# Patient Record
Sex: MALE | Race: WHITE | Employment: FULL TIME | URBAN - METROPOLITAN AREA
[De-identification: names, ages, dates, MRNs, and addresses within clinical notes are randomized per-mention and may not be internally consistent; named-entity substitution may affect disease eponyms.]

---

## 2018-11-29 ENCOUNTER — OFFICE VISIT (OUTPATIENT)
Dept: URGENT CARE | Facility: URGENT CARE | Age: 28
End: 2018-11-29
Payer: COMMERCIAL

## 2018-11-29 VITALS
WEIGHT: 165 LBS | HEART RATE: 92 BPM | TEMPERATURE: 99.3 F | BODY MASS INDEX: 23.62 KG/M2 | OXYGEN SATURATION: 99 % | HEIGHT: 70 IN | DIASTOLIC BLOOD PRESSURE: 79 MMHG | SYSTOLIC BLOOD PRESSURE: 126 MMHG

## 2018-11-29 DIAGNOSIS — J06.9 VIRAL URI WITH COUGH: Primary | ICD-10-CM

## 2018-11-29 DIAGNOSIS — H61.22 IMPACTED CERUMEN OF LEFT EAR: ICD-10-CM

## 2018-11-29 DIAGNOSIS — R07.0 THROAT PAIN: ICD-10-CM

## 2018-11-29 LAB
DEPRECATED S PYO AG THROAT QL EIA: NORMAL
SPECIMEN SOURCE: NORMAL

## 2018-11-29 PROCEDURE — 99203 OFFICE O/P NEW LOW 30 MIN: CPT | Mod: 25 | Performed by: PHYSICIAN ASSISTANT

## 2018-11-29 PROCEDURE — 87081 CULTURE SCREEN ONLY: CPT | Performed by: PHYSICIAN ASSISTANT

## 2018-11-29 PROCEDURE — 69209 REMOVE IMPACTED EAR WAX UNI: CPT | Mod: LT | Performed by: PHYSICIAN ASSISTANT

## 2018-11-29 PROCEDURE — 87880 STREP A ASSAY W/OPTIC: CPT | Performed by: PHYSICIAN ASSISTANT

## 2018-11-29 NOTE — MR AVS SNAPSHOT
After Visit Summary   11/29/2018    Brayden Redmond    MRN: [id number]           Patient Information     Date Of Birth          1990        Visit Information        Provider Department      11/29/2018 6:50 PM Libia Davis PA-C [facility]        Today's Diagnoses     Viral URI with cough    -  1    Throat pain        Impacted cerumen of left ear          Care Instructions    1. Viral upper respiratory infection.  Testing today showed negative Strep test.    Your symptoms are most likely due to a viral self-limiting infection that should clear spontaneously in the next 3-7 days.    Symptomatic cares recommended:  -nasal saline rinses/sprays 1-2 times daily (use distilled or previously boiled water)  -adequate rest  -copious hydration  -ibuprofen or acetaminophen for comfort  -Robitussin or Mucinex as needed for cough, nasal congestion  -throat lozenges as needed for sore throat    Follow-up with primary care provider if not improving in 5-7 days, sooner if worsening.     If you develop high fevers that do not go down with ibuprofen/Tylenol, shortness of breath, cough up any blood, chest pain, or any other new, concerning symptoms, please go to the ER for further evaluation.            Follow-ups after your visit        Follow-up notes from your care team     Return if symptoms worsen or fail to improve.      Who to contact     If you have questions or need follow up information about today's clinic visit or your schedule please contact [facility] directly at [phone].  Normal or non-critical lab and imaging results will be communicated to you by MyChart, letter or phone within 4 business days after the clinic has received the results. If you do not hear from us within 7 days, please contact the clinic through MyChart or phone. If you have a critical or abnormal lab result, we will notify you by phone as soon as possible.  Submit refill  "requests through [employer] or call your pharmacy and they will forward the refill request to us. Please allow 3 business days for your refill to be completed.          Additional Information About Your Visit        [employer] Information     [employer] lets you send messages to your doctor, view your test results, renew your prescriptions, schedule appointments and more. To sign up, go to [email or web address] . Click on \"Log in\" on the left side of the screen, which will take you to the Welcome page. Then click on \"Sign up Now\" on the right side of the page.     You will be asked to enter the access code listed below, as well as some personal information. Please follow the directions to create your username and password.     Your access code is: [email or web address]  Expires: 2019  8:28 PM     Your access code will  in 90 days. If you need help or a new code, please call your [facility] clinic or [phone].        Care EveryWhere ID     This is your Care EveryWhere ID. This could be used by other organizations to access your [facility] medical records  [id number]        Your Vitals Were     Pulse Temperature Height Pulse Oximetry BMI (Body Mass Index)       92 99.3  F (37.4  C) (Oral) 5' 10\" (1.778 m) 99% 23.68 kg/m2        Blood Pressure from Last 3 Encounters:   18 126/79    Weight from Last 3 Encounters:   18 165 lb (74.8 kg)              We Performed the Following     Beta strep group A culture     HC REMOVAL IMPACTED CERUMEN IRRIGATION/LVG UNILAT     Rapid strep screen        Primary Care Provider Fax #    Provider Not In System [phone]                Equal Access to Services     [facility] RADHA : Hadii kirsten Delgado, waaxda cristóbalqadaha, qaybta kaalmada yahaira, chino cornelius. So [facility] [phone].    ATENCIÓN: Si habla español, tiene a shah disposición servicios gratuitos de asistencia lingüística. Llame al [phone].    We comply with applicable federal civil " rights laws and [place] laws. We do not discriminate on the basis of race, color, national origin, age, disability, sex, sexual orientation, or gender identity.            Thank you!     Thank you for choosing [facility]  for your care. Our goal is always to provide you with excellent care. Hearing back from our patients is one way we can continue to improve our services. Please take a few minutes to complete the written survey that you may receive in the mail after your visit with us. Thank you!             Your Updated Medication List - Protect others around you: Learn how to safely use, store and throw away your medicines at www.disposemymeds.org.          This list is accurate as of 11/29/18  8:28 PM.  Always use your most recent med list.                   Brand Name Dispense Instructions for use Diagnosis    TYLENOL PO

## 2018-11-30 LAB
BACTERIA SPEC CULT: NORMAL
SPECIMEN SOURCE: NORMAL

## 2018-11-30 NOTE — NURSING NOTE
Patient identified using two patient identifiers.  Ear exam showing wax occlusion completed by provider.  Solution: warm water was placed in the bilateral ear(s) via irrigation tool: elephant ear   smiller,cma  .

## 2018-11-30 NOTE — PATIENT INSTRUCTIONS
1. Viral upper respiratory infection.  Testing today showed negative Strep test.    Your symptoms are most likely due to a viral self-limiting infection that should clear spontaneously in the next 3-7 days.    Symptomatic cares recommended:  -nasal saline rinses/sprays 1-2 times daily (use distilled or previously boiled water)  -adequate rest  -copious hydration  -ibuprofen or acetaminophen for comfort  -Robitussin or Mucinex as needed for cough, nasal congestion  -throat lozenges as needed for sore throat    Follow-up with primary care provider if not improving in 5-7 days, sooner if worsening.     If you develop high fevers that do not go down with ibuprofen/Tylenol, shortness of breath, cough up any blood, chest pain, or any other new, concerning symptoms, please go to the ER for further evaluation.

## 2018-11-30 NOTE — PROGRESS NOTES
"SUBJECTIVE:   Brayden Redmond is a 28 year old male presenting with a chief complaint of   Chief Complaint   Patient presents with     Urgent Care     Pt in clinic to have eval for sore throat, fever, cough, headache, and congestion.     Pharyngitis     Respiratory Problems     Headache     Fever   .    URI Adult    Onset of symptoms was 4 day(s) ago.  Course of illness is worsening.    Severity moderately severe  Current and Associated symptoms: sudden onset sore throat, HA, congestion on Monday.  Had a fever yesterday. Tmax 100.4F.  Feels fatigued. Admits to body aches.  Admits to sometimes productive cough.   Had some nausea, no vomiting. Is tolerating PO intake. Had some diarrhea yesterday. No abdominal pain. No rashes.  Treatment measures tried include Tylenol  Predisposing factors include: no known ill contacts. No PMH asthma.    ROS  See HPI    PMH:  No past medical history on file.  There is no problem list on file for this patient.      Current Medications:  Current Outpatient Prescriptions   Medication Sig Dispense Refill     Acetaminophen (TYLENOL PO)          Surgical History:  No past surgical history on file.    Family History:  No family history on file.    Social History:  Social History   Substance Use Topics     Smoking status: Never Smoker     Smokeless tobacco: Never Used     Alcohol use Not on file          OBJECTIVE  /79  Pulse 92  Temp 99.3  F (37.4  C) (Oral)  Ht 5' 10\" (1.778 m)  Wt 165 lb (74.8 kg)  SpO2 99%  BMI 23.68 kg/m2    General: alert, appears generally well, NAD. Afebrile.  Skin: no suspicious lesions or rashes.  HEENT: Normocephalic.   Eyes: conjunctiva clear.   Ears: right ear: TMs pearly, translucent. Scant cerumen in canal. Left ear: complete cerumen impaction.   Nose:+ erythema of nasal mucosa. No rhinorrhea.  Oropharynx: MMM. Mild posterior pharyngeal erythema, no petechiae or exudate. No tonsillar hypertrophy. Uvula midline.    Neck: supple, no " lymphadenopathy.  Respiratory: No distress. Equal inspiration to bilateral bases. No crackles wheeze, rhonchi, rales.   Cardiovascular: RRR. No murmurs, clicks, gallups, or rub.   Gastrointestinal: Abdomen soft, nontender, BS present. No masses, organomegaly.        Labs:  Results for orders placed or performed in visit on 11/29/18 (from the past 24 hour(s))   Rapid strep screen   Result Value Ref Range    Specimen Description Throat     Rapid Strep A Screen       NEGATIVE: No Group A streptococcal antigen detected by immunoassay, await culture report.           ASSESSMENT/PLAN:    ICD-10-CM    1. Viral URI with cough J06.9     B97.89    2. Throat pain R07.0 Rapid strep screen     Beta strep group A culture   3. Impacted cerumen of left ear H61.22 HC REMOVAL IMPACTED CERUMEN IRRIGATION/LVG UNILAT           Medical Decision Making:    Serious Comorbid Conditions: none    Differential Diagnosis:   -Strep- RST negative. No signs of tonsillitis, peritonsillar abscess, deep neck abscess on exam or history today. Is tolerating PO intake.  -influenza- considered but is far outside the treatment window now (4 days into illness) and does not have comorbidities to warrant treatment regardless of time frame. Appears well now and is afebrile here. Did not test for influenza today. Discussed this reasoning with patient.  -pneumonia- less likely as lungs are clear, has diffuse URI symptoms. Do not think CXR is indicated today.  -viral URI- most suspect this to be the cause of symptoms.    I discussed likely viral etiology of URI symptoms. Recommend supportive cares including rest, antipyretics as needed, time. Follow up with PCP if no improvement in 5-7 days, sooner if worsening.    Patient was found to have cerumen impaction on left side on initial exam. I ordered for MA to perform irrigation. Upon my recheck, cerumen was completely removed and TM was clear- no signs of otitis media.    At the end of the encounter, I discussed  all available results, as well as the diagnosis and any associated medications. I discussed red flags for immediate return to clinic/ER, as well as indications for follow up. Refer to patient instructions below, which were all addressed with patient. Patient understood and agreed to plan. Patient was appropriate for discharge.      Patient Instructions   1. Viral upper respiratory infection.  Testing today showed negative Strep test.    Your symptoms are most likely due to a viral self-limiting infection that should clear spontaneously in the next 3-7 days.    Symptomatic cares recommended:  -nasal saline rinses/sprays 1-2 times daily (use distilled or previously boiled water)  -adequate rest  -copious hydration  -ibuprofen or acetaminophen for comfort  -Robitussin or Mucinex as needed for cough, nasal congestion  -throat lozenges as needed for sore throat    Follow-up with primary care provider if not improving in 5-7 days, sooner if worsening.     If you develop high fevers that do not go down with ibuprofen/Tylenol, shortness of breath, cough up any blood, chest pain, or any other new, concerning symptoms, please go to the ER for further evaluation.                Libia Davis PA-C

## 2019-01-07 ENCOUNTER — OFFICE VISIT (OUTPATIENT)
Dept: INTERNAL MEDICINE | Facility: CLINIC | Age: 29
End: 2019-01-07
Payer: COMMERCIAL

## 2019-01-07 VITALS
BODY MASS INDEX: 24.91 KG/M2 | DIASTOLIC BLOOD PRESSURE: 70 MMHG | HEART RATE: 74 BPM | OXYGEN SATURATION: 99 % | WEIGHT: 174 LBS | SYSTOLIC BLOOD PRESSURE: 117 MMHG | HEIGHT: 70 IN

## 2019-01-07 DIAGNOSIS — Z00.00 ROUTINE HEALTH MAINTENANCE: Primary | ICD-10-CM

## 2019-01-07 SDOH — HEALTH STABILITY: MENTAL HEALTH: HOW OFTEN DO YOU HAVE A DRINK CONTAINING ALCOHOL?: 2-3 TIMES A WEEK

## 2019-01-07 ASSESSMENT — ENCOUNTER SYMPTOMS
HOARSE VOICE: 0
NERVOUS/ANXIOUS: 1
TASTE DISTURBANCE: 0
SINUS CONGESTION: 0
BLOATING: 0
NAUSEA: 0
ABDOMINAL PAIN: 0
TROUBLE SWALLOWING: 0
DECREASED CONCENTRATION: 0
SINUS PAIN: 0
VOMITING: 0
DEPRESSION: 1
SORE THROAT: 0
RECTAL PAIN: 0
BLOOD IN STOOL: 0
SMELL DISTURBANCE: 0
DIARRHEA: 1
PANIC: 0
BOWEL INCONTINENCE: 0
CONSTIPATION: 0
NECK MASS: 0
HEARTBURN: 0
INSOMNIA: 0
JAUNDICE: 0

## 2019-01-07 ASSESSMENT — PAIN SCALES - GENERAL: PAINLEVEL: NO PAIN (0)

## 2019-01-07 ASSESSMENT — MIFFLIN-ST. JEOR: SCORE: 1775.51

## 2019-01-07 NOTE — PATIENT INSTRUCTIONS
[facility] Medication Refill Request Information:  * Please contact your pharmacy regarding ANY request for medication refills.  ** [facility] Prescription Fax = [phone]  * Please allow 3 business days for routine medication refills.  * Please allow 5 business days for controlled substance medication refills.     [facility] Test Result notification information:  *You will be notified with in 7-10 days of your appointment day regarding the results of your test.  If you are on MyChart you will be notified as soon as the provider has reviewed the results and signed off on them.    [facility]: [phone]

## 2019-01-07 NOTE — NURSING NOTE
Chief Complaint   Patient presents with     Establish Care     patient is establishing care with dr shirley Ramirez at 8:50 AM on 1/7/2019.

## 2019-01-07 NOTE — PROGRESS NOTES
HPI  28-year-old [facility]  presents today to establish primary care.  He has been in good health.  He exercises regularly he has been tolerating this well.  His diet is reasonably healthy although he concedes he could do better with his fruit and vegetable consumption.  Is under a lot of stress at work is coping with this with the support of his coworkers and exercise.  Since stable relationship this is also providing him a source of support in dealing with work stress.  He has had some issues with hemorrhoids is dealing with this with topical steroid and we discussed using sitz baths.  Past Medical History:   Diagnosis Date     NO ACTIVE PROBLEMS      Past Surgical History:   Procedure Laterality Date     ORIF left arm       TONSILLECTOMY       Family History   Problem Relation Age of Onset     Melanoma Father      Social History     Socioeconomic History     Marital status:      Spouse name: None     Number of children: None     Years of education: None     Highest education level: None   Social Needs     Financial resource strain: None     Food insecurity - worry: None     Food insecurity - inability: None     Transportation needs - medical: None     Transportation needs - non-medical: None   Occupational History     Occupation:      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   Tobacco Use     Smoking status: Never Smoker     Smokeless tobacco: Never Used   Substance and Sexual Activity     Alcohol use: Yes     Frequency: 2-3 times a week     Drug use: No     Sexual activity: Yes     Partners: Male   Other Topics Concern     None   Social History Narrative     None     Answers for HPI/ROS submitted by the patient on 1/7/2019   General Symptoms: No  Skin Symptoms: No  HENT Symptoms: Yes  EYE SYMPTOMS: No  HEART SYMPTOMS: No  LUNG SYMPTOMS: No  INTESTINAL SYMPTOMS: Yes  URINARY SYMPTOMS: No  REPRODUCTIVE SYMPTOMS: No  SKELETAL SYMPTOMS: No  BLOOD SYMPTOMS: No  NERVOUS SYSTEM SYMPTOMS: No  MENTAL  "HEALTH SYMPTOMS: Yes  Ear pain: No  Ear discharge: No  Hearing loss: No  Tinnitus: No  Nosebleeds: No  Congestion: No  Sinus pain: No  Trouble swallowing: No   Voice hoarseness: No  Mouth sores: No  Sore throat: No  Tooth pain: No  Gum tenderness: No  Bleeding gums: Yes  Change in taste: No  Change in sense of smell: No  Dry mouth: No  Hearing aid used: No  Neck lump: No  Heart burn or indigestion: No  Nausea: No  Vomiting: No  Abdominal pain: No  Bloating: No  Constipation: No  Diarrhea: Yes  Blood in stool: No  Black stools: No  Rectal or Anal pain: No  Fecal incontinence: No  Yellowing of skin or eyes: No  Vomit with blood: No  Change in stools: No  Nervous or Anxious: Yes  Depression: Yes  Trouble sleeping: No  Trouble thinking or concentrating: No  Mood changes: Yes  Panic attacks: No    Exam:  /70 (BP Location: Right arm, Patient Position: Sitting)   Pulse 74   Ht 1.778 m (5' 10\")   Wt 78.9 kg (174 lb)   SpO2 99%   BMI 24.97 kg/m    174 lbs 0 oz  Physical Exam   The patient is alert, oriented with a clear sensorium.   Skin shows no lesions or rashes and good turgor.   Head is normocephalic and atraumatic.   Eyes show PERRLA with benign optic fundi.   Ears show normal TMs bilaterally.   Mouth shows clear oral mucosa.   Neck shows no nodes, thyromegaly or bruits.   Back is non tender.  Lungs are clear to percussion and auscultation.   Heart shows normal S1 and S2 without murmur or gallop.   Abdomen is soft and nontender without masses or organomegaly.   Genitalia show normal testes. No evidence of inguinal hernia.  Extremities show no edema and no evidence of active synovitis.   Neurologic examination shows cranial nerves II-XII intact. Motor shows 5/5 strength. Reflexes are symmetric. Cerebellar testing shows normal tandem gait.  Romberg negative.    ASSESSMENT  1 stress and anxiety  2 history of hemorrhoids    Plan  Recent labs were reviewed through care everywhere and are excellent he is current on " his immunizations will encourage him to exercise for stress reduction follow-up in a couple years or as needed for symptoms.    This note was completed using Dragon voice recognition software.  Although reviewed after completion, some word and grammatical errors may occur.    Nic Le MD  General Internal Medicine  [facility]  [phone]

## 2019-02-21 ENCOUNTER — TELEPHONE (OUTPATIENT)
Dept: OTHER | Facility: CLINIC | Age: 29
End: 2019-02-21

## 2019-02-21 NOTE — TELEPHONE ENCOUNTER
2/21/2019    Call Regarding Onboarding MVP OTHER     Attempt 1    Message on voicemail     Comments:       Outreach   LR

## 2019-02-26 NOTE — TELEPHONE ENCOUNTER
2/26/2019    Call Regarding Onboarding: SALLIE NAVA     Attempt 2    Message on voicemail     Comments: 1 Adult Dep      Outreach   JOSELUIS

## 2019-03-02 NOTE — TELEPHONE ENCOUNTER
3/2/2019    Call Regarding Onboarding: SALLIE NAVA     Attempt 3    Message left with patient     Comments: 1 Adult Dep      Outreach   JOSELUIS

## 2020-02-05 ENCOUNTER — OFFICE VISIT (OUTPATIENT)
Dept: INTERNAL MEDICINE | Facility: CLINIC | Age: 30
End: 2020-02-05
Payer: COMMERCIAL

## 2020-02-05 ENCOUNTER — TELEPHONE (OUTPATIENT)
Dept: INTERNAL MEDICINE | Facility: CLINIC | Age: 30
End: 2020-02-05

## 2020-02-05 VITALS
TEMPERATURE: 97.9 F | BODY MASS INDEX: 24.74 KG/M2 | OXYGEN SATURATION: 97 % | RESPIRATION RATE: 18 BRPM | HEART RATE: 64 BPM | SYSTOLIC BLOOD PRESSURE: 120 MMHG | DIASTOLIC BLOOD PRESSURE: 85 MMHG | WEIGHT: 172.4 LBS

## 2020-02-05 DIAGNOSIS — B07.9 VIRAL WARTS, UNSPECIFIED TYPE: ICD-10-CM

## 2020-02-05 DIAGNOSIS — K64.9 BLEEDING HEMORRHOIDS: Primary | ICD-10-CM

## 2020-02-05 RX ORDER — HYDROCORTISONE ACETATE SUPPOSITORY 30 MG/1
1 SUPPOSITORY RECTAL DAILY
Qty: 28 SUPPOSITORY | Refills: 3 | Status: SHIPPED | OUTPATIENT
Start: 2020-02-05 | End: 2020-10-01

## 2020-02-05 ASSESSMENT — ANXIETY QUESTIONNAIRES
3. WORRYING TOO MUCH ABOUT DIFFERENT THINGS: NEARLY EVERY DAY
1. FEELING NERVOUS, ANXIOUS, OR ON EDGE: MORE THAN HALF THE DAYS
GAD7 TOTAL SCORE: 16
7. FEELING AFRAID AS IF SOMETHING AWFUL MIGHT HAPPEN: MORE THAN HALF THE DAYS
IF YOU CHECKED OFF ANY PROBLEMS ON THIS QUESTIONNAIRE, HOW DIFFICULT HAVE THESE PROBLEMS MADE IT FOR YOU TO DO YOUR WORK, TAKE CARE OF THINGS AT HOME, OR GET ALONG WITH OTHER PEOPLE: VERY DIFFICULT
6. BECOMING EASILY ANNOYED OR IRRITABLE: NEARLY EVERY DAY
2. NOT BEING ABLE TO STOP OR CONTROL WORRYING: NEARLY EVERY DAY
5. BEING SO RESTLESS THAT IT IS HARD TO SIT STILL: NOT AT ALL

## 2020-02-05 ASSESSMENT — PATIENT HEALTH QUESTIONNAIRE - PHQ9
SUM OF ALL RESPONSES TO PHQ QUESTIONS 1-9: 9
SUM OF ALL RESPONSES TO PHQ QUESTIONS 1-9: 9
5. POOR APPETITE OR OVEREATING: NEARLY EVERY DAY
10. IF YOU CHECKED OFF ANY PROBLEMS, HOW DIFFICULT HAVE THESE PROBLEMS MADE IT FOR YOU TO DO YOUR WORK, TAKE CARE OF THINGS AT HOME, OR GET ALONG WITH OTHER PEOPLE: VERY DIFFICULT

## 2020-02-05 ASSESSMENT — PAIN SCALES - GENERAL: PAINLEVEL: NO PAIN (0)

## 2020-02-05 NOTE — NURSING NOTE
Chief Complaint   Patient presents with     Derm Problem     pt. is concerned with a spot on his bottom        Page Perales, EMT

## 2020-02-05 NOTE — PATIENT INSTRUCTIONS
[facility] Medication Refill Request Information:  * Please contact your pharmacy regarding ANY request for medication refills.  ** [facility] Prescription Fax = [phone]  * Please allow 3 business days for routine medication refills.  * Please allow 5 business days for controlled substance medication refills.     [facility] Test Result notification information:  *You will be notified with in 7-10 days of your appointment day regarding the results of your test.  If you are on MyChart you will be notified as soon as the provider has reviewed the results and signed off on them.

## 2020-02-06 ASSESSMENT — ANXIETY QUESTIONNAIRES: GAD7 TOTAL SCORE: 16

## 2020-02-06 NOTE — TELEPHONE ENCOUNTER
[facility] Prior Authorization Team Request    Medication: Hydrocortisone 30 mg Suppository  Dosing: Unwrap and insert one suppository rectally once daily  Qty: 28  Day Supply: 28  NDC (required for Medicaid members): [address]-57     Insurance   BIN: [id number]  PCN: [id number]  Grp:   ID: [id number]    CoverMyMeds Key (if applicable):     Additional documentation:       Filling Pharmacy: [facility]  Phone Number: [phone]  Contact:  Shorty Le  Pharmacy NPI (required for Medicaid members): [id number]

## 2020-02-06 NOTE — PROGRESS NOTES
HPI  29-year-old presents today for rectal bleeding.  He has had a long history of intermittent rectal bleeding has been attributed to hemorrhoids in the past.  He continues to have bright red blood only when he wipes.  He is not noted any blood mixed in with the stool is not any diarrhea has not had any other change in his bowel movements.  He said no associated fever chills sweats abdominal pain or other complaints.  Otherwise he has been doing well until recently when he noted a lump near his rectum.  He is concerned that the lump may be contributing to his bleeding.  He has not had any pain or discomfort in the lump.  Otherwise he is been in good health he does have sex with men but does not have receptive anal intercourse by his report.  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   Social Needs     Financial resource strain: None     Food insecurity:     Worry: None     Inability: None     Transportation needs:     Medical: None     Non-medical: None   Tobacco Use     Smoking status: Never Smoker     Smokeless tobacco: Never Used   Substance and Sexual Activity     Alcohol use: Yes     Frequency: 2-3 times a week     Drug use: No     Sexual activity: Yes     Partners: Male   Lifestyle     Physical activity:     Days per week: None     Minutes per session: None     Stress: None   Relationships     Social connections:     Talks on phone: None     Gets together: None     Attends Restorationist service: None     Active member of club or organization: None      Attends meetings of clubs or organizations: None     Relationship status: None     Intimate partner violence:     Fear of current or ex partner: None     Emotionally abused: None     Physically abused: None     Forced sexual activity: None   Other Topics Concern     None   Social History Narrative     None     Answers for HPI/ROS submitted by the patient on 2/5/2020   If you checked off any problems, how difficult have these problems made it for you to do your work, take care of things at home, or get along with other people?: Very difficult  PHQ9 TOTAL SCORE: 9    Exam:  /85 (BP Location: Right arm, Patient Position: Chair, Cuff Size: Adult Large)   Pulse 64   Temp 97.9  F (36.6  C) (Oral)   Resp 18   Wt 78.2 kg (172 lb 6.4 oz)   SpO2 97%   BMI 24.74 kg/m    172 lbs 6.4 oz  On examination there is a small somewhat pedunculated wart along the 9 o'clock position near the rectum the rectum the anus itself appears normal there is appears to be internal hemorrhoids normal prostate on digital examination    ASSESSMENT  1 perianal wart  2 bleeding hemorrhoids    Plan  We discussed a high-fiber diet plenty of fluids sitz bath's we will try Proctocort suppositories for a month we will follow-up with colorectal regarding destruction of the wart    This note was completed using Dragon voice recognition software.  Although reviewed after completion, some word and grammatical errors may occur.    Nic Le MD  General Internal Medicine  [facility]  [phone]

## 2020-02-07 NOTE — TELEPHONE ENCOUNTER
RECORDS RECEIVED FROM: [facility]- Dr. Nic Le    DATE RECEIVED: 3/11/2020   NOTES STATUS DETAILS   OFFICE NOTE from referring provider  Internal 2/5/2020 Office visit with Dr. Le    OFFICE NOTE from other specialist   N/A    DISCHARGE SUMMARY from hospital  N/A    DISCHARGE REPORT from the ER N/A    OPERATIVE REPORT  N/A    MEDICATION LIST Internal    LABS     PFC TESTING N/A    ANAL PAP N/A    BIOPSIES/PATHOLOGY RELATED TO DIAGNOSIS N/A    DIAGNOSTIC PROCEDURES     COLONOSCOPY N/A    UPPER ENDOSCOPY (EGD) N/A    FLEX SIGMOIDOSCOPY  N/A    ERCP N/A    IMAGING (DISC & REPORT)      CT  N/A    MRI N/A    XRAY N/A    ULTRASOUND (ENDOANAL/ENDORECTAL) N/A      *call Pt for outside med recs    2/20/2020 4:49pm Called and spoke with Pt; no outside med recs. -Jojo

## 2020-02-08 NOTE — TELEPHONE ENCOUNTER
Central Prior Authorization Team   Phone: [phone]      PA Initiation    Medication: Hydrocortisone 30 mg Suppository  Insurance Company: [employer] - Phone [phone] Fax [phone]  Pharmacy Filling the Rx: [facility] [place], [place] - [address] [place] [address] [phone]  Filling Pharmacy Phone: [phone]  Filling Pharmacy Fax:    Start Date: 2/7/2020

## 2020-02-12 NOTE — TELEPHONE ENCOUNTER
Looks like he will need to buy it OTC  JITENDRA    PRIOR AUTHORIZATION DENIED    Medication: Hydrocortisone 30 mg Suppository-DENIED    Denial Date: 2/11/2020    Denial Rational:         Appeal Information:

## 2020-02-18 NOTE — TELEPHONE ENCOUNTER
I updated patient that hydrocortisone suppository is not covered by insurance. We discussed treatment options including paying out of pocket (recommended comparing prices at [facility]) or purchasing an OTC option. Emmett voiced understanding of the recommendations.    Debbie Palumbo) EMILY Lucero

## 2020-03-11 ENCOUNTER — OFFICE VISIT (OUTPATIENT)
Dept: SURGERY | Facility: CLINIC | Age: 30
End: 2020-03-11
Payer: COMMERCIAL

## 2020-03-11 ENCOUNTER — HEALTH MAINTENANCE LETTER (OUTPATIENT)
Age: 30
End: 2020-03-11

## 2020-03-11 ENCOUNTER — PRE VISIT (OUTPATIENT)
Dept: SURGERY | Facility: CLINIC | Age: 30
End: 2020-03-11

## 2020-03-11 VITALS
HEART RATE: 61 BPM | WEIGHT: 167.6 LBS | OXYGEN SATURATION: 95 % | SYSTOLIC BLOOD PRESSURE: 132 MMHG | BODY MASS INDEX: 23.99 KG/M2 | DIASTOLIC BLOOD PRESSURE: 85 MMHG | HEIGHT: 70 IN

## 2020-03-11 DIAGNOSIS — K62.5 RECTAL BLEEDING: Primary | ICD-10-CM

## 2020-03-11 ASSESSMENT — ENCOUNTER SYMPTOMS
BLOATING: 0
SMELL DISTURBANCE: 0
BOWEL INCONTINENCE: 0
POOR WOUND HEALING: 0
DEPRESSION: 1
NAUSEA: 0
SKIN CHANGES: 0
SINUS PAIN: 0
BLOOD IN STOOL: 1
NERVOUS/ANXIOUS: 1
PANIC: 1
RECTAL PAIN: 0
CONSTIPATION: 0
SINUS CONGESTION: 0
NECK MASS: 0
HOARSE VOICE: 0
TASTE DISTURBANCE: 0
HEARTBURN: 0
NAIL CHANGES: 0
INSOMNIA: 1
VOMITING: 0
DECREASED CONCENTRATION: 0
TROUBLE SWALLOWING: 0
JAUNDICE: 0
DIARRHEA: 0
ABDOMINAL PAIN: 0
SORE THROAT: 0

## 2020-03-11 ASSESSMENT — PAIN SCALES - GENERAL: PAINLEVEL: NO PAIN (0)

## 2020-03-11 ASSESSMENT — MIFFLIN-ST. JEOR: SCORE: 1741.48

## 2020-03-11 NOTE — PROGRESS NOTES
"Colon and Rectal Surgery Consult Clinic Note    Date: 3/11/2020     Referring provider:  Nic Le MD  [address] [place] [address]     RE: Brayden Redmond  : 1990  BALTAZAR: 3/11/2020    Brayden Redmond is a very pleasant 29 year old male without a significant past medical history with a recent diagnosis of rectal bleeding and anal wart.  Given these findings they were subsequently sent to the Colon and Rectal Surgery Clinic for an opinion on this and a new patient consultation.     Brayden reports that he has had a small bump in his perianal skin for about a year.  This is not painful.  He is never had any anogenital warts in the past.  He is otherwise healthy.  He is not a smoker.  He gets intermittent bright red blood with bowel movements.  He reports that this is a very small amount on the toilet paper with wiping and not associated with any pain.  No prolapsing tissue.  Bowel movements are anywhere from loose to formed.  He denies any constipation or straining.    Physical Examination: Exam was chaperoned by Subhash Mishra LPN   /85 (BP Location: Left arm, Patient Position: Sitting, Cuff Size: Adult Regular)   Pulse 61   Ht 5' 10\"   Wt 167 lb 9.6 oz   SpO2 95%   BMI 24.05 kg/m    General: Alert, oriented, in no acute distress, sitting comfortably  HEENT: Mucous membranes moist  Perianal external examination:  Perianal skin: Small skin tag in the left lateral perianal position.  No perianal condyloma on exam.  Digital rectal examination: Was performed.   Sphincter tone: Good.  Palpable lesions: No.  Prostate: Normal.  Other: None.    Anoscopy: Was performed.   Hemorrhoids: Yes.  Grade 2 internal hemorrhoids without active bleeding  Lesions: No    Assessment/Plan: No evidence of any condyloma on exam.  He does have a very small skin tag in left lateral position but this appears completely benign.  He does have some small internal hemorrhoids and get some intermittent bright red " blood.  He reports that this is only a very small amount on the toilet paper with wiping.  He does have some intermittent loose stools.  Would like to try bulking of his stools with a fiber supplement to see if this improves the symptoms.  However, if bleeding persist, would recommend a colonoscopy.  We discussed doing a colonoscopy now versus trying the fiber supplement first.  He would first like to try the fiber supplement and will contact the clinic if he has any persistent bleeding. Patient's questions were answered to his stated satisfaction and he is in agreement with this plan.    Medical history:  Past Medical History:   Diagnosis Date     NO ACTIVE PROBLEMS        Surgical history:  Past Surgical History:   Procedure Laterality Date     ORIF left arm       TONSILLECTOMY         Problem list:  There are no active problems to display for this patient.      Medications:  Current Outpatient Medications   Medication Sig Dispense Refill     Acetaminophen (TYLENOL PO) Take by mouth as needed for mild pain        hydrocortisone acetate (PROCTOCORT) 30 MG SUPP Place 1 suppository rectally daily (Patient not taking: Reported on 3/11/2020) 28 suppository 3       Allergies:  No Known Allergies    Family history:  Family History   Problem Relation Age of Onset     Melanoma Father        Social history:  Social History     Tobacco Use     Smoking status: Never Smoker     Smokeless tobacco: Never Used   Substance Use Topics     Alcohol use: Yes     Frequency: 2-3 times a week    Marital status: .  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    There are no exam notes on file for this visit.     Total face to face time was 20 minutes, >50% counseling.    CORINA Lerma, NP-C  Colon and Rectal Surgery   [facility]    This note was created using speech recognition software and may contain unintended word substitutions.

## 2020-03-11 NOTE — LETTER
"3/11/2020       RE: Brayden Redmond  [address]  [address]  [place] [facility] [place] [address]     Dear Colleague,    Thank you for referring your patient, Brayden Redmond, to the [facility] COLON AND RECTAL SURGERY at [facility]. Please see a copy of my visit note below.    Colon and Rectal Surgery Consult Clinic Note    Date: 3/11/2020     Referring provider:  Nic Le MD  [address] [place] [address]     RE: Brayden Redmond  : 1990  BALTAZAR: 3/11/2020    Brayden Redmond is a very pleasant 29 year old male without a significant past medical history with a recent diagnosis of rectal bleeding and anal wart.  Given these findings they were subsequently sent to the Colon and Rectal Surgery Clinic for an opinion on this and a new patient consultation.     Brayden reports that he has had a small bump in his perianal skin for about a year.  This is not painful.  He is never had any anogenital warts in the past.  He is otherwise healthy.  He is not a smoker.  He gets intermittent bright red blood with bowel movements.  He reports that this is a very small amount on the toilet paper with wiping and not associated with any pain.  No prolapsing tissue.  Bowel movements are anywhere from loose to formed.  He denies any constipation or straining.    Physical Examination: Exam was chaperoned by Subhash Mishra LPN   /85 (BP Location: Left arm, Patient Position: Sitting, Cuff Size: Adult Regular)   Pulse 61   Ht 5' 10\"   Wt 167 lb 9.6 oz   SpO2 95%   BMI 24.05 kg/m    General: Alert, oriented, in no acute distress, sitting comfortably  HEENT: Mucous membranes moist  Perianal external examination:  Perianal skin: Small skin tag in the left lateral perianal position.  No perianal condyloma on exam.  Digital rectal examination: Was performed.   Sphincter tone: Good.  Palpable lesions: No.  Prostate: Normal.  Other: None.    Anoscopy: Was performed.   Hemorrhoids: Yes.  " Grade 2 internal hemorrhoids without active bleeding  Lesions: No    Assessment/Plan: No evidence of any condyloma on exam.  He does have a very small skin tag in left lateral position but this appears completely benign.  He does have some small internal hemorrhoids and get some intermittent bright red blood.  He reports that this is only a very small amount on the toilet paper with wiping.  He does have some intermittent loose stools.  Would like to try bulking of his stools with a fiber supplement to see if this improves the symptoms.  However, if bleeding persist, would recommend a colonoscopy.  We discussed doing a colonoscopy now versus trying the fiber supplement first.  He would first like to try the fiber supplement and will contact the clinic if he has any persistent bleeding. Patient's questions were answered to his stated satisfaction and he is in agreement with this plan.    Medical history:  Past Medical History:   Diagnosis Date     NO ACTIVE PROBLEMS        Surgical history:  Past Surgical History:   Procedure Laterality Date     ORIF left arm       TONSILLECTOMY         Problem list:  There are no active problems to display for this patient.      Medications:  Current Outpatient Medications   Medication Sig Dispense Refill     Acetaminophen (TYLENOL PO) Take by mouth as needed for mild pain        hydrocortisone acetate (PROCTOCORT) 30 MG SUPP Place 1 suppository rectally daily (Patient not taking: Reported on 3/11/2020) 28 suppository 3       Allergies:  No Known Allergies    Family history:  Family History   Problem Relation Age of Onset     Melanoma Father        Social history:  Social History     Tobacco Use     Smoking status: Never Smoker     Smokeless tobacco: Never Used   Substance Use Topics     Alcohol use: Yes     Frequency: 2-3 times a week    Marital status: .  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    There are no exam notes on file for this visit.     Total face to face time was 20  minutes, >50% counseling.    CORINA Lerma, NP-C  Colon and Rectal Surgery   [facility]    This note was created using speech recognition software and may contain unintended word substitutions.

## 2020-04-29 ENCOUNTER — VIRTUAL VISIT (OUTPATIENT)
Dept: FAMILY MEDICINE | Facility: OTHER | Age: 30
End: 2020-04-29

## 2020-04-29 NOTE — PROGRESS NOTES
"Date: 2020 12:32:45  Clinician: Joss Ellis  Clinician NPI: [id number]  Patient: Brayden Redmond  Patient : 1990  Patient Address: [address] [place] [address]  Patient Phone: [phone]  Visit Protocol: URI  Patient Summary:  Brayden is a 29 year old ( : 1990 ) male who initiated a Visit for COVID-19 (Coronavirus) evaluation and screening. When asked the question \"Please sign me up to receive news, health information and promotions from [employer].\", Brayden responded \"No\".    Brayden states his symptoms started today.   His symptoms consist of malaise, myalgia, a cough, nausea, diarrhea, and a headache.   Symptom details     Cough: Brayden coughs a few times an hour and his cough is not more bothersome at night. Phlegm does not come into his throat when he coughs. He does not believe his cough is caused by post-nasal drip.     Headache: He states the headache is moderate (4-6 on a 10 point pain scale).      Brayden denies having fever, rhinitis, facial pain or pressure, sore throat, nasal congestion, vomiting, teeth pain, ageusia, anosmia, ear pain, wheezing, enlarged lymph nodes, and chills. He also denies taking antibiotic medication for the symptoms and having recent facial or sinus surgery in the past 60 days. He is not experiencing dyspnea.   Precipitating events  He has not recently been exposed to someone with influenza. Brayden has been in close contact with the following high risk individuals: immunocompromised people.   Pertinent COVID-19 (Coronavirus) information  Brayden does not work or volunteer as healthcare worker or a  and does not work or volunteer in a healthcare facility.   He does not live with a healthcare worker.   Brayden has not had a close contact with a laboratory-confirmed COVID-19 patient within 14 days of symptom onset. He also has not had a close contact with a suspected COVID-19 patient within 14 days of symptom onset.   Pertinent " medical history  Brayden does not need a return to work/school note.   Weight: 170 lbs   Brayden does not smoke or use smokeless tobacco.   Weight: 170 lbs    MEDICATIONS: No current medications, ALLERGIES: NKDA  Clinician Response:  Dear Brayden,   Dear Brayden  Your symptoms show that you may have coronavirus (COVID-19). This illness can cause fever, cough and trouble breathing. Many people get a mild case and get better on their own. Some people can get very sick.  Will I be tested for COVID-19?  Because we have limited testing supplies we are not testing everyone if they are low risk. We are testing if:   You are very ill. For example, you're on chemotherapy, dialysis or home hospice care. (Contact your specialty clinic or program.)   You live in a nursing home or other long-term care facility. (Talk to your nurse manager or medical director.)   You're a health care worker. ([facility] employees Contact our employee health office for testing.)   We are performing limited curbside testing for healthcare/first responders and people with medical problems that put them at increased risk. It does not appear by the [facility] information you submitted that you meet any of these criteria. If there are medical problems that we did not know about, please repeat an [facility] visit and let us know what medical conditions you have.   How can I protect others?  Without a test, we can't know for sure that you have COVID-19. For safety, it's very important to follow these rules.  First, stay home and away from others (self-isolate) until:   You've had no fever---and no medicine that reduces fever---for 3 full days (72 hours). And...    Your other symptoms have gotten better. For example, your cough or breathing has improved. And...   At least 7 days have passed since your symptoms started.   During this time:   Don't go to work, school or anywhere else.    Stay away from others in your home. No hugging, kissing or shaking hands.    Don't let anyone visit.   Cover your mouth and nose with a mask, tissue or wash cloth to avoid spreading germs.   Wash your hands and face often. Use soap and water.   How can I take care of myself?  1.Take Tylenol (acetaminophen) for fever or pain. If you have liver or kidney problems, ask your family doctor if it's okay to take Tylenol.   Adults can take either:    650 mg (two 325 mg pills) every 4 to 6 hours, or...   1,000 mg (two 500 mg pills) every 8 hours as needed.    Note: Don't take more than 3,000 mg in one day.  For children, check the Tylenol bottle for the right dose. The dose is based on the child's age or weight.   2.If you have other health problems (like cancer, heart failure, an organ transplant or severe kidney disease): Call your specialty clinic if you don't feel better in the next 2 days.  3.Know when to call 911: If your breathing is so bad that it keeps you from doing normal activities, call 911 or go to the emergency room. Tell them that you've been staying home and may have COVID-19.  4.Sign up for [employer]. We know it's scary to hear that you might have COVID-19. We want to track your symptoms to make sure you're okay over the next 2 weeks. Please look for an email from [employer]---this is a free, online program that we'll use to keep in touch. To sign up, follow the link in the email. Learn more at [email or web address].  Where can I get more information?  To learn more about COVID-19 and how to care for yourself at home, please visit the CDC website at https://www.cdc.gov/coronavirus/2019-ncov/about/steps-when-sick.html.  For more options for care at [facility], please visit our website at [email or web address]   If you are interested in becoming part of a [facility] clinic trial related to COVID19 please go to [email or web address] for information, if you qualify.     Diagnosis: Cough  Diagnosis ICD: R05

## 2020-07-03 ENCOUNTER — ALLIED HEALTH/NURSE VISIT (OUTPATIENT)
Dept: INTERNAL MEDICINE | Facility: CLINIC | Age: 30
End: 2020-07-03
Payer: COMMERCIAL

## 2020-07-03 DIAGNOSIS — H61.23 BILATERAL IMPACTED CERUMEN: Primary | ICD-10-CM

## 2020-07-03 NOTE — NURSING NOTE
Brayden Redmond comes into clinic today at the request of Dr. Le Ordering Provider for ear wash.     Bilateral ear lavage completed in clinic today for impacted cerumen.  Large amount of cerumen removed from both ears.  Tympanic membrane visible bilaterally.  Patient tolerated well. No redness noted in the left ear and a little redness noted on the outer part of the right ear. Pt had no pain. Instructed patient to call clinic if patient had any pain.       This service provided today was under the supervising provider of the day Dr. Zavala, who was available if needed.    Janet Cárdenas LPN at 2:51 PM on 7/3/2020.

## 2020-09-03 ENCOUNTER — OFFICE VISIT (OUTPATIENT)
Dept: OPHTHALMOLOGY | Facility: CLINIC | Age: 30
End: 2020-09-03
Attending: OPHTHALMOLOGY
Payer: COMMERCIAL

## 2020-09-03 DIAGNOSIS — H53.19 VITREOUS FLASHES OF BOTH EYES: Primary | ICD-10-CM

## 2020-09-03 PROCEDURE — G0463 HOSPITAL OUTPT CLINIC VISIT: HCPCS | Mod: ZF

## 2020-09-03 ASSESSMENT — EXTERNAL EXAM - LEFT EYE: OS_EXAM: NORMAL

## 2020-09-03 ASSESSMENT — CONF VISUAL FIELD
OS_NORMAL: 1
OD_NORMAL: 1
METHOD: COUNTING FINGERS

## 2020-09-03 ASSESSMENT — VISUAL ACUITY
OS_CC: 20/20
CORRECTION_TYPE: CONTACTS
OD_CC: 20/20
OD_CC+: +1
METHOD: SNELLEN - LINEAR

## 2020-09-03 ASSESSMENT — TONOMETRY
OS_IOP_MMHG: 18
IOP_METHOD: TONOPEN
OD_IOP_MMHG: 18

## 2020-09-03 ASSESSMENT — EXTERNAL EXAM - RIGHT EYE: OD_EXAM: NORMAL

## 2020-09-03 ASSESSMENT — SLIT LAMP EXAM - LIDS
COMMENTS: NORMAL
COMMENTS: NORMAL

## 2020-09-03 ASSESSMENT — CUP TO DISC RATIO
OD_RATIO: 0.25
OS_RATIO: 0.3

## 2020-09-03 NOTE — NURSING NOTE
Chief Complaints and History of Present Illnesses   Patient presents with     Flashes Left Eye     Chief Complaint(s) and History of Present Illness(es)     Flashes Left Eye     Laterality: left eye    Quality: lightning bolts    Associated symptoms: Negative for floaters, shade and blurred vision    Pain scale: 0/10              Comments     Pt notes last week he had a few days of lightning bolt flashes in peripheral vision LE- hasn't had any episodes since.  Denies any floaters, shade/curtain, blurry vision, pain, pressure, irritation, discharge, and tearing.  Ocular meds: None    Maura Sindhu OT 9:04 AM September 3, 2020

## 2020-09-03 NOTE — PROGRESS NOTES
I have confirmed the patient's and reviewed Past Medical History, Past Surgical History, Social History, Family History, Problem List, Medication List and agree with Tech note.      CC -  New F/F    INTERVAL HISTORY - Initial visit with me. On 8/27 and 8/28 while sitting by a window in the morning, he suddenly developed flashing lights in the left peripheral vision of his left eye. Over the last 2 days, the flashes have subsided. No new floaters. No photophobia. Vision has been unchanged - 20/20. HAYDEN was 1/2020 with optometry to update glasses; eye were not dilated at that time. No recent illness.       HPI -   Brayden Redmond is a  29 year old year-old patient who presents with F/F OS.     PAST OCULAR SURGERY  None    RETINAL IMAGING:  None    ASSESSMENT & PLAN    1. Photopsia, OS   - Onset 8/27/20, resolved ~9/1/20   - ?etiology - migraine possible but not classic    -  unremarkable   - Discussed s/sxs that would warrant urgent evaluation, including new flashes, new floaters, or a curtain obscuring vision.   - RTC 4 weeks for DFE OU     2. Myopia of both eyes   - HAYDEN 1/2020   - BCVA 20/20   - Follow up with optometry for annual exam    return to clinic: 4 weeks for DFE OU; sooner for any new concerns       Nic Medina MD  Ophthalmology PGY-3  [facility]     ATTESTATION:    I have seen and examined the patient with Dr. Medina and agree with the findings in this note,as well as the interpretations of the diagnostic tests.    José Ray MD PhD.  Professor & Chair

## 2020-09-08 ENCOUNTER — VIRTUAL VISIT (OUTPATIENT)
Dept: FAMILY MEDICINE | Facility: OTHER | Age: 30
End: 2020-09-08
Payer: COMMERCIAL

## 2020-09-08 PROCEDURE — 99421 OL DIG E/M SVC 5-10 MIN: CPT | Performed by: PHYSICIAN ASSISTANT

## 2020-09-08 NOTE — PROGRESS NOTES
"Date: 2020 16:22:21  Clinician: Emerson Castro  Clinician NPI: [id number]  Patient: Brayden Redmond  Patient : 1990  Patient Address: [address] [place] [address]  Patient Phone: [phone]  Visit Protocol: URI  Patient Summary:  Brayden is a 29 year old ( : 1990 ) male who initiated a Visit for COVID-19 (Coronavirus) evaluation and screening. When asked the question \"Please sign me up to receive news, health information and promotions from [employer].\", Brayden responded \"No\".    Brayden states his symptoms started 1-2 days ago.   His symptoms consist of facial pain or pressure, nausea, a sore throat, tooth pain, nasal congestion, a headache, malaise, and myalgia. He is experiencing mild difficulty breathing with activities but can speak normally in full sentences.   Symptom details     Nasal secretions: The color of his mucus is green.    Sore throat: Brayden reports having moderate throat pain (4-6 on a 10 point pain scale), does not have exudate on his tonsils, and can swallow liquids. He is not sure if the lymph nodes in his neck are enlarged. A rash has not appeared on the skin since the sore throat started.     Facial pain or pressure: The facial pain or pressure feels worse when bending over or leaning forward.     Headache: He states the headache is moderate (4-6 on a 10 point pain scale).     Tooth pain: The tooth pain is not caused by a cavity, recent dental work, or other mouth problems.      Brayden denies having ear pain, anosmia, fever, vomiting, rhinitis, wheezing, ageusia, diarrhea, cough, and chills. He also denies taking antibiotic medication in the past month, having recent facial or sinus surgery in the past 60 days, and having a sinus infection within the past year.   Precipitating events  Within the past week, Brayden has not been exposed to someone with strep throat. He has not recently been exposed to someone with influenza. Brayden has been in close contact " with the following high risk individuals: immunocompromised people.   Pertinent COVID-19 (Coronavirus) information  In the past 14 days, Brayden has not worked in a congregate living setting.   He does not work or volunteer as healthcare worker or a  and does not work or volunteer in a healthcare facility.   Brayden also has not lived in a congregate living setting in the past 14 days. He does not live with a healthcare worker.   Brayden has not had a close contact with a laboratory-confirmed COVID-19 patient within 14 days of symptom onset.   Since December 2019, Brayden and has not had upper respiratory infection or influenza-like illness. Has not been diagnosed with lab-confirmed COVID-19 test   Pertinent medical history  Brayden does not need a return to work/school note.   Weight: 165 lbs   Brayden does not smoke or use smokeless tobacco.   Weight: 165 lbs    MEDICATIONS: No current medications, ALLERGIES: NKDA  Clinician Response:  Dear Brayden,   Your symptoms show that you may have coronavirus (COVID-19). This illness can cause fever, cough and trouble breathing. Many people get a mild case and get better on their own. Some people can get very sick.  What should I do?  We would like to test you for this virus.   1. Please call [phone] to schedule your visit. Explain that you were referred by [facility] to have a COVID-19 test. Be ready to share your [facility] visit ID number.  The following will serve as your written order for this COVID Test, ordered by me, for the indication of suspected COVID [Z20.828]: The test will be ordered in [facility], our electronic health record, after you are scheduled. It will show as ordered and authorized by Ayush Montes MD.  Order: COVID-19 (Coronavirus) PCR for SYMPTOMATIC testing from [facility].      2. When it's time for your COVID test:  Stay at least 6 feet away from others. (If someone will drive you to your test, stay in the backseat, as far away from the  as you can.)    "Cover your mouth and nose with a mask, tissue or washcloth.  Go straight to the testing site. Don't make any stops on the way there or back.      3.Starting now: Stay home and away from others (self-isolate) until:   You've had no fever---and no medicine that reduces fever---for one full day (24 hours). And...   Your other symptoms have gotten better. For example, your cough or breathing has improved. And...   At least 10 days have passed since your symptoms started.       During this time, don't leave the house except for testing or medical care.   Stay in your own room, even for meals. Use your own bathroom if you can.   Stay away from others in your home. No hugging, kissing or shaking hands. No visitors.  Don't go to work, school or anywhere else.    Clean \"high touch\" surfaces often (doorknobs, counters, handles, etc.). Use a household cleaning spray or wipes. You'll find a full list of  on the EPA website: www.epa.gov/pesticide-registration/list-n-disinfectants-use-against-sars-cov-2.   Cover your mouth and nose with a mask, tissue or washcloth to avoid spreading germs.  Wash your hands and face often. Use soap and water.  Caregivers in these groups are at risk for severe illness due to COVID-19:  o People 65 years and older  o People who live in a nursing home or long-term care facility  o People with chronic disease (lung, heart, cancer, diabetes, kidney, liver, immunologic)  o People who have a weakened immune system, including those who:   Are in cancer treatment  Take medicine that weakens the immune system, such as corticosteroids  Had a bone marrow or organ transplant  Have an immune deficiency  Have poorly controlled HIV or AIDS  Are obese (body mass index of 40 or higher)  Smoke regularly   o Caregivers should wear gloves while washing dishes, handling laundry and cleaning bedrooms and bathrooms.  o Use caution when washing and drying laundry: Don't shake dirty laundry, and use the warmest " water setting that you can.  o For more tips, go to www.cdc.gov/coronavirus/2019-ncov/downloads/10Things.pdf.       How can I take care of myself?   Get lots of rest. Drink extra fluids (unless a doctor has told you not to).   Take Tylenol (acetaminophen) for fever or pain. If you have liver or kidney problems, ask your family doctor if it's okay to take Tylenol.   Adults can take either:    650 mg (two 325 mg pills) every 4 to 6 hours, or...   1,000 mg (two 500 mg pills) every 8 hours as needed.    Note: Don't take more than 3,000 mg in one day. Acetaminophen is found in many medicines (both prescribed and over-the-counter medicines). Read all labels to be sure you don't take too much.   For children, check the Tylenol bottle for the right dose. The dose is based on the child's age or weight.    If you have other health problems (like cancer, heart failure, an organ transplant or severe kidney disease): Call your specialty clinic if you don't feel better in the next 2 days.       Know when to call 911. Emergency warning signs include:    Trouble breathing or shortness of breath Pain or pressure in the chest that doesn't go away Feeling confused like you haven't felt before, or not being able to wake up Bluish-colored lips or face.  Where can I get more information?   [facility] -- About COVID-19: [email or web address]/   CDC -- What to Do If You're Sick: www.cdc.gov/coronavirus/2019-ncov/about/steps-when-sick.html   CDC -- Ending Home Isolation: www.cdc.gov/coronavirus/2019-ncov/hcp/disposition-in-home-patients.html   CDC -- Caring for Someone: www.cdc.gov/coronavirus/2019-ncov/if-you-are-sick/care-for-someone.html   [facility] -- Interim Guidance for Hospital Discharge to Home: www.health.[facility].[place].us/diseases/coronavirus/hcp/hospdischarge.pdf   [facility] clinical trials (COVID-19 research studies): clinicalaffairs.[facility]/umn-clinical-trials    Below are the COVID-19 hotlines at the  [place] Department of Health ([facility]). Interpreters are available.    For health questions: Call 493-125-0663 or 1-358.871.8994 (7 a.m. to 7 p.m.) For questions about schools and childcare: Call 334-566-3875 or 1-278.682.7345 (7 a.m. to 7 p.m.)    Diagnosis: Nasal congestion  Diagnosis ICD: R09.81

## 2020-09-10 DIAGNOSIS — Z20.822 SUSPECTED COVID-19 VIRUS INFECTION: Primary | ICD-10-CM

## 2020-09-11 LAB
SARS-COV-2 RNA SPEC QL NAA+PROBE: NOT DETECTED
SPECIMEN SOURCE: NORMAL

## 2020-10-01 ENCOUNTER — OFFICE VISIT (OUTPATIENT)
Dept: OPHTHALMOLOGY | Facility: CLINIC | Age: 30
End: 2020-10-01
Attending: OPHTHALMOLOGY
Payer: COMMERCIAL

## 2020-10-01 DIAGNOSIS — H53.19 VITREOUS FLASHES OF BOTH EYES: Primary | ICD-10-CM

## 2020-10-01 PROCEDURE — 99213 OFFICE O/P EST LOW 20 MIN: CPT | Performed by: OPHTHALMOLOGY

## 2020-10-01 PROCEDURE — G0463 HOSPITAL OUTPT CLINIC VISIT: HCPCS

## 2020-10-01 ASSESSMENT — CONF VISUAL FIELD
OD_NORMAL: 1
OS_NORMAL: 1

## 2020-10-01 ASSESSMENT — CUP TO DISC RATIO
OD_RATIO: 0.25
OS_RATIO: 0.3

## 2020-10-01 ASSESSMENT — SLIT LAMP EXAM - LIDS
COMMENTS: NORMAL
COMMENTS: NORMAL

## 2020-10-01 ASSESSMENT — VISUAL ACUITY
OS_CC: 20/20
METHOD: SNELLEN - LINEAR
CORRECTION_TYPE: CONTACTS
OD_CC: 20/20

## 2020-10-01 ASSESSMENT — TONOMETRY
OD_IOP_MMHG: 18
IOP_METHOD: TONOPEN
OS_IOP_MMHG: 23

## 2020-10-01 ASSESSMENT — EXTERNAL EXAM - LEFT EYE: OS_EXAM: NORMAL

## 2020-10-01 ASSESSMENT — EXTERNAL EXAM - RIGHT EYE: OD_EXAM: NORMAL

## 2020-10-01 NOTE — PROGRESS NOTES
I have confirmed the patient's and reviewed Past Medical History, Past Surgical History, Social History, Family History, Problem List, Medication List and agree with Tech note.      CC -  New F/F    INTERVAL HISTORY - Initial visit with me. On 8/27 and 8/28 while sitting by a window in the morning, he suddenly developed flashing lights in the left peripheral vision of his left eye. Over the last 2 days, the flashes have subsided. No new floaters. No photophobia. Vision has been unchanged - 20/20. HAYDEN was 1/2020 with optometry to update glasses; eye were not dilated at that time. No recent illness.       HPI -   Brayden Redmond is a  29 year old year-old patient who presents with F/F OS.     PAST OCULAR SURGERY  None    RETINAL IMAGING:  None    ASSESSMENT & PLAN    1. Photopsia, OS   - Onset 8/27/20, resolved ~9/1/20   - ?etiology - migraine possible but not classic    -  unremarkable   - Discussed s/sxs that would warrant urgent evaluation, including new flashes, new floaters, or a curtain obscuring vision.   - RTC 4 weeks for DFE OU     2. Myopia of both eyes   - HAYDEN 1/2020   - BCVA 20/20   - Follow up with optometry for annual exam    return to clinic: 6 month for DFE OU; sooner for any new concerns       José Ray MD PhD.  Professor & Chair

## 2020-10-15 ENCOUNTER — VIRTUAL VISIT (OUTPATIENT)
Dept: INTERNAL MEDICINE | Facility: CLINIC | Age: 30
End: 2020-10-15
Payer: COMMERCIAL

## 2020-10-15 DIAGNOSIS — F99 INSOMNIA DUE TO OTHER MENTAL DISORDER: ICD-10-CM

## 2020-10-15 DIAGNOSIS — F33.9 EPISODE OF RECURRENT MAJOR DEPRESSIVE DISORDER, UNSPECIFIED DEPRESSION EPISODE SEVERITY (H): Primary | ICD-10-CM

## 2020-10-15 DIAGNOSIS — F51.05 INSOMNIA DUE TO OTHER MENTAL DISORDER: ICD-10-CM

## 2020-10-15 PROCEDURE — 99213 OFFICE O/P EST LOW 20 MIN: CPT | Mod: 95 | Performed by: STUDENT IN AN ORGANIZED HEALTH CARE EDUCATION/TRAINING PROGRAM

## 2020-10-15 RX ORDER — SERTRALINE HYDROCHLORIDE 25 MG/1
TABLET, FILM COATED ORAL
Qty: 187 TABLET | Refills: 1 | Status: SHIPPED | OUTPATIENT
Start: 2020-10-15 | End: 2021-04-14

## 2020-10-15 RX ORDER — TRAZODONE HYDROCHLORIDE 50 MG/1
50 TABLET, FILM COATED ORAL AT BEDTIME
Qty: 90 TABLET | Refills: 0 | Status: SHIPPED | OUTPATIENT
Start: 2020-10-15

## 2020-10-15 NOTE — NURSING NOTE
Chief Complaint   Patient presents with     Depression     Pt would like to discuss depression, PHQ9 done at check in      Christinadonovan Aldana, EMT at 7:58 AM sign on 10/15/2020    Note the high PHQ9 score. Pt declined to answer the final question and would be more comfortable talking with the provider about suicidal ideations.

## 2020-10-15 NOTE — PROGRESS NOTES
"Video Visit Technology for this patient: Delisa not working, patient has smart device, please try [employer] Video with patient [phone]     Brayden Redmond is a 29 year old male who is being evaluated via a billable video visit.      The patient has been notified of following:     \"This video visit will be conducted via a call between you and your physician/provider. We have found that certain health care needs can be provided without the need for an in-person physical exam.  This service lets us provide the care you need with a video conversation.  If a prescription is necessary we can send it directly to your pharmacy.  If lab work is needed we can place an order for that and you can then stop by our lab to have the test done at a later time.    Video visits are billed at different rates depending on your insurance coverage.  Please reach out to your insurance provider with any questions.    If during the course of the call the physician/provider feels a video visit is not appropriate, you will not be charged for this service.\"    Patient has given verbal consent for Video visit? Yes  How would you like to obtain your AVS? MyChart  If you are dropped from the video visit, the video invite should be resent to: Text to cell phone: [phone]   Will anyone else be joining your video visit? No          Subjective     Brayden Redmond is a 29 year old male who presents today via video visit for the following health issues: Depression      HPI    Emmett reports he is experiencing depressive symptoms and would like to discuss medication options today. He has been diagnosed with depression previously 5-6 years ago when he was in college and at that time he went through therapy and did feel that helped him. He reports over the last several months of the pandemic he has felt his depression return. Stressors for him are mostly his job and his boss. He has a good support system which includes his  whom he lives " "with. He states he has been having suicidal ideations that are distressing to him but no plans for any suicide and no prior history of attempt. He notes \"I don't consider myself a high suicide risk but these thoughts are disturbing\". He also reports a lot of guilt and associated insomnia because of anxiety and depression and he feels his mind is racing. Usual coping strategies are socializing with others which is difficult now given pandemic.     Video Start Time: 8:15 am      Review of Systems   Constitutional, HEENT, cardiovascular, pulmonary, gi and gu systems are negative, except as otherwise noted.      Objective           Vitals:  No vitals were obtained today due to virtual visit.    Physical Exam     GENERAL: Healthy, alert and no distress  EYES: Eyes grossly normal to inspection.  No discharge or erythema, or obvious scleral/conjunctival abnormalities.  RESP: No audible wheeze, cough, or visible cyanosis.  No visible retractions or increased work of breathing.    SKIN: Visible skin clear. No significant rash, abnormal pigmentation or lesions.  NEURO: Cranial nerves grossly intact.  Mentation and speech appropriate for age.  PSYCH: Mentation appears normal, affect normal/bright, judgement and insight intact, normal speech and appearance well-groomed.      Assessment & Plan     Episode of recurrent major depressive disorder, unspecified depression episode severity (H)  Depression with suicidal ideation without plan. No previous anti-depressant use but therapy helped in the past we should do a combination of selective serotonin reuptake inhibitor and therapy now. Given he is having SI without plan should be re-evaluated in about 1 week. Counseled him on side effects of SSRIs.   - sertraline (ZOLOFT) 25 MG tablet  Dispense: 187 tablet; Refill: 1  - MENTAL HEALTH REFERRAL  - Adult; Outpatient Treatment; Individual/Couples/Family/Group Therapy/Health Psychology; [facility] - Zbigniew Williamson ([phone] " [phone]; Patient call to schedule    Insomnia due to other mental disorder  - traZODone (DESYREL) 50 MG tablet  Dispense: 90 tablet; Refill: 0    Return in about 1 week (around 10/22/2020) for Follow up, with any available provider, using a video visit. To re-evaluate his depressive symptoms given SI this video visit.     Mirit Mohsen Yacoup, MD  [facility] MEDICINE [place]    Patient discussed with Dr Toya Cespedes.      Video-Visit Details    Type of service:  Video Visit    Video End Time:8:32 AM    Originating Location (pt. Location): Home    Distant Location (provider location):  [facility]     Platform [facility] for Video Visit: [facility]        Teaching Physician Note:    I, Dr. Cespedes, was immediately accessible to the resident, and agree with the residents's findings and plan of care, as documented in the resident's note.     Toya Cespedes M.D.  Internal Medicine  [facility]         Patients: if you have questions or concerns about this progress note, please discuss them with the provider at at a future office visit.

## 2021-01-03 ENCOUNTER — HEALTH MAINTENANCE LETTER (OUTPATIENT)
Age: 31
End: 2021-01-03

## 2021-04-14 ENCOUNTER — MYC REFILL (OUTPATIENT)
Dept: INTERNAL MEDICINE | Facility: CLINIC | Age: 31
End: 2021-04-14

## 2021-04-14 DIAGNOSIS — F33.9 EPISODE OF RECURRENT MAJOR DEPRESSIVE DISORDER, UNSPECIFIED DEPRESSION EPISODE SEVERITY (H): ICD-10-CM

## 2021-04-15 DIAGNOSIS — F33.9 EPISODE OF RECURRENT MAJOR DEPRESSIVE DISORDER, UNSPECIFIED DEPRESSION EPISODE SEVERITY (H): ICD-10-CM

## 2021-04-15 NOTE — TELEPHONE ENCOUNTER
Patient would like to be weaned off of this medication. Please follow up with patient to advise. Thank you!

## 2021-04-15 NOTE — TELEPHONE ENCOUNTER
sertraline (ZOLOFT) 25 MG tablet  Take 1 tablet (25 mg) by mouth daily for 7 days, THEN 2 tablets (50 mg) daily. Increase to 50 mg daily after 7 days if you experienced no side effects       Last Written Prescription Date:  10/15/20-1/20/21  Last Fill Quantity: 187,   # refills: 1  Last Office Visit : 10/15/20  Future Office visit:  none    Routing refill request to provider for review/approval because:  Overdue visit and PHQ9. Per last visit 10/15/20, Return in about 1 week (around 10/22/2020) for Follow up, with any available provider, using a video visit. To re-evaluate his depressive symptoms given SI this video visit.     Scheduling has been notified to contact the pt for appointment.

## 2021-04-17 RX ORDER — SERTRALINE HYDROCHLORIDE 25 MG/1
50 TABLET, FILM COATED ORAL DAILY
Qty: 60 TABLET | Refills: 0 | Status: SHIPPED | OUTPATIENT
Start: 2021-04-17 | End: 2021-05-21

## 2021-04-19 RX ORDER — SERTRALINE HYDROCHLORIDE 25 MG/1
TABLET, FILM COATED ORAL
Qty: 187 TABLET | Refills: 1 | OUTPATIENT
Start: 2021-04-19

## 2021-04-19 NOTE — TELEPHONE ENCOUNTER
Discontinued Reorder Debbie Olmos, RN 4/17/21 [id number]     Dose/direction change;    PARVEEN to Anabelle: note/reply from Dr. Cespedes-     Please have patient make an appointment with Dr. Le (his PCP), or a Thursday resident (last seen by Dr. Peters) to discuss his question about weaning off sertraline.  I think a virtual visit would be fine.  Thank you. SB   Message text     Maegan Mcgill, Toya Love MD 3 days ago    Patient would like to wean off of this medication.     Please advise

## 2021-04-25 ENCOUNTER — HEALTH MAINTENANCE LETTER (OUTPATIENT)
Age: 31
End: 2021-04-25

## 2021-10-10 ENCOUNTER — HEALTH MAINTENANCE LETTER (OUTPATIENT)
Age: 31
End: 2021-10-10

## 2022-05-21 ENCOUNTER — HEALTH MAINTENANCE LETTER (OUTPATIENT)
Age: 32
End: 2022-05-21

## 2022-09-18 ENCOUNTER — HEALTH MAINTENANCE LETTER (OUTPATIENT)
Age: 32
End: 2022-09-18

## 2023-06-04 ENCOUNTER — HEALTH MAINTENANCE LETTER (OUTPATIENT)
Age: 33
End: 2023-06-04

## 2024-10-31 ASSESSMENT — PATIENT HEALTH QUESTIONNAIRE - PHQ9: SUM OF ALL RESPONSES TO PHQ QUESTIONS 1-9: 9
